# Patient Record
Sex: FEMALE | Race: WHITE | NOT HISPANIC OR LATINO | Employment: FULL TIME | URBAN - METROPOLITAN AREA
[De-identification: names, ages, dates, MRNs, and addresses within clinical notes are randomized per-mention and may not be internally consistent; named-entity substitution may affect disease eponyms.]

---

## 2018-03-17 LAB — HCV AB SER-ACNC: NEGATIVE

## 2020-03-16 ENCOUNTER — OFFICE VISIT (OUTPATIENT)
Dept: URGENT CARE | Facility: CLINIC | Age: 60
End: 2020-03-16
Payer: COMMERCIAL

## 2020-03-16 VITALS
DIASTOLIC BLOOD PRESSURE: 74 MMHG | HEIGHT: 63 IN | WEIGHT: 215.6 LBS | HEART RATE: 67 BPM | OXYGEN SATURATION: 100 % | RESPIRATION RATE: 18 BRPM | BODY MASS INDEX: 38.2 KG/M2 | SYSTOLIC BLOOD PRESSURE: 147 MMHG | TEMPERATURE: 98.2 F

## 2020-03-16 DIAGNOSIS — R35.0 URINE FREQUENCY: Primary | ICD-10-CM

## 2020-03-16 LAB
SL AMB  POCT GLUCOSE, UA: NEGATIVE
SL AMB LEUKOCYTE ESTERASE,UA: NEGATIVE
SL AMB POCT BILIRUBIN,UA: NEGATIVE
SL AMB POCT BLOOD,UA: NEGATIVE
SL AMB POCT CLARITY,UA: NORMAL
SL AMB POCT COLOR,UA: YELLOW
SL AMB POCT KETONES,UA: NORMAL
SL AMB POCT NITRITE,UA: NEGATIVE
SL AMB POCT PH,UA: 5
SL AMB POCT SPECIFIC GRAVITY,UA: 1.01
SL AMB POCT URINE PROTEIN: NEGATIVE
SL AMB POCT UROBILINOGEN: 0.2

## 2020-03-16 PROCEDURE — 87086 URINE CULTURE/COLONY COUNT: CPT | Performed by: NURSE PRACTITIONER

## 2020-03-16 PROCEDURE — 81002 URINALYSIS NONAUTO W/O SCOPE: CPT | Performed by: NURSE PRACTITIONER

## 2020-03-16 PROCEDURE — 99213 OFFICE O/P EST LOW 20 MIN: CPT | Performed by: NURSE PRACTITIONER

## 2020-03-16 RX ORDER — SIMVASTATIN 40 MG
40 TABLET ORAL
COMMUNITY
Start: 2019-09-11

## 2020-03-16 RX ORDER — CEPHALEXIN 500 MG/1
500 CAPSULE ORAL EVERY 12 HOURS SCHEDULED
Qty: 14 CAPSULE | Refills: 0 | Status: SHIPPED | OUTPATIENT
Start: 2020-03-16 | End: 2020-03-23

## 2020-03-16 RX ORDER — INSULIN GLARGINE 300 U/ML
INJECTION, SOLUTION SUBCUTANEOUS
COMMUNITY
Start: 2020-01-27

## 2020-03-16 RX ORDER — PIOGLITAZONE HCL AND METFORMIN HCL 850; 15 MG/1; MG/1
TABLET ORAL
COMMUNITY
Start: 2019-09-11

## 2020-03-16 RX ORDER — FLUCONAZOLE 150 MG/1
TABLET ORAL
COMMUNITY
Start: 2020-03-06 | End: 2020-06-12 | Stop reason: ALTCHOICE

## 2020-03-16 RX ORDER — METOPROLOL TARTRATE 100 MG/1
TABLET ORAL
COMMUNITY
Start: 2019-09-11

## 2020-03-16 RX ORDER — ALPRAZOLAM 0.25 MG/1
TABLET ORAL AS NEEDED
COMMUNITY
Start: 2019-11-11

## 2020-03-16 RX ORDER — CYCLOBENZAPRINE HCL 5 MG
TABLET ORAL AS NEEDED
COMMUNITY
Start: 2019-09-11 | End: 2020-07-24 | Stop reason: ALTCHOICE

## 2020-03-16 RX ORDER — LANCETS 23 GAUGE
EACH MISCELLANEOUS
COMMUNITY
Start: 2019-12-04

## 2020-03-16 RX ORDER — PEN NEEDLE, DIABETIC 32GX 5/32"
NEEDLE, DISPOSABLE MISCELLANEOUS DAILY
COMMUNITY
Start: 2020-03-07

## 2020-03-16 RX ORDER — TRIAMCINOLONE ACETONIDE 0.25 MG/G
CREAM TOPICAL
COMMUNITY
Start: 2020-03-06 | End: 2020-06-12 | Stop reason: ALTCHOICE

## 2020-03-16 NOTE — PATIENT INSTRUCTIONS
Urine dip does not appear to show UTI but will treat based on symptoms  Will send for a culture  Tylenol Motrin as needed for pain  Follow up with PCP or Urology if symptoms not improved  Go to the ER with any worsening symptoms, increased pressure, pain, fevers

## 2020-03-16 NOTE — PROGRESS NOTES
[facility]        NAME: Elyssa Desir is a 63 y o  female  : 1960    MRN: [id number]  DATE: 2020  TIME: 3:16 PM    Assessment and Plan   Urine frequency [R35 0]  1  Urine frequency  POCT urine dip    cephalexin (KEFLEX) 500 mg capsule         Patient Instructions     Patient Instructions   Urine dip does not appear to show UTI but will treat based on symptoms  Will send for a culture  Tylenol Motrin as needed for pain  Follow up with PCP or Urology if symptoms not improved  Go to the ER with any worsening symptoms, increased pressure, pain, fevers  Chief Complaint     Chief Complaint   Patient presents with    Urinary Frequency     started 2 weeks ago, was taking AZO burining, pressure and frequency         History of Present Illness   Mitzi Koenig presents to the clinic c/o    This is a 52-year-old female here today with complaints of urinary frequency  She states she has been having this for about 2 weeks  She is also having burning, frequency and urgency  She has been taking over-the-counter azo which is not helping  She denies any fevers bodies or chills  She does have suprapubic pressure  She is diabetic  She states her morning blood sugars are 150 and in the afternoon she has but 256  There is no glucose in her urine today  She denies any fevers body aches or chills  No discharge  Review of Systems   Review of Systems   Constitutional: Negative for activity change, chills, fatigue and fever  Respiratory: Negative  Cardiovascular: Negative  Genitourinary: Positive for dysuria, frequency and urgency  Negative for decreased urine volume, difficulty urinating, flank pain and hematuria  Psychiatric/Behavioral: Negative            Current Medications     Long-Term Medications   Medication Sig Dispense Refill    ALPRAZolam (XANAX) 0 25 mg tablet as needed      cyclobenzaprine (FLEXERIL) 5 mg tablet as needed      Lancets 43N MISC Delica Lancets to match New Glucose Monitor,  Test once daily  DX E11 9   Non Insulin Dependent   metoprolol tartrate (LOPRESSOR) 100 mg tablet TAKE 1/2 TAB BY MOUTH IN THE AM AND 1 FULL TAB IN THE PM      pioglitazone-metFORMIN (ACTOPLUS MET)  MG per tablet TAKE 1 TABLET BY MOUTH 2 TIMES DAILY WITH MEALS      simvastatin (ZOCOR) 40 mg tablet Take 40 mg by mouth      BD PEN NEEDLE LISSETT U/F 32G X 4 MM MISC daily As directed      TOUJEO SOLOSTAR 300 units/mL CONCETRATED U-300 injection pen (1-unit dial)       triamcinolone (KENALOG) 0 025 % cream APPLY TO AFFECTED AREA TWICE A DAY FOR 7 DAYS         Current Allergies     Allergies as of 03/16/2020 - Reviewed 03/16/2020   Allergen Reaction Noted    Codeine  03/30/2015    Diphenhydramine  03/30/2015    Lisinopril  03/30/2015    Sulfa antibiotics  03/30/2015            The following portions of the patient's history were reviewed and updated as appropriate: allergies, current medications, past family history, past medical history, past social history, past surgical history and problem list     Objective   /74   Pulse 67   Temp 98 2 °F (36 8 °C)   Resp 18   Ht 5' 3" (1 6 m)   Wt 97 8 kg (215 lb 9 6 oz)   SpO2 100%   BMI 38 19 kg/m²        Physical Exam     Physical Exam   Constitutional: She is oriented to person, place, and time  She appears well-developed and well-nourished  Cardiovascular: Normal rate and regular rhythm  Pulmonary/Chest: Effort normal and breath sounds normal    Abdominal:   Suprapubic pressure   Musculoskeletal: Normal range of motion  Neurological: She is alert and oriented to person, place, and time  Skin: Skin is warm and dry  Psychiatric: She has a normal mood and affect  Her behavior is normal  Judgment and thought content normal    Nursing note and vitals reviewed

## 2020-03-17 LAB — BACTERIA UR CULT: NORMAL

## 2020-03-19 ENCOUNTER — TELEPHONE (OUTPATIENT)
Dept: URGENT CARE | Facility: CLINIC | Age: 60
End: 2020-03-19

## 2020-03-19 NOTE — TELEPHONE ENCOUNTER
Aware of negative urine culture  She states she is feeling better with antibiotic  Complete antibiotic and follow-up with Urology  She verbalized understanding of these instructions

## 2020-06-12 ENCOUNTER — APPOINTMENT (OUTPATIENT)
Dept: RADIOLOGY | Facility: MEDICAL CENTER | Age: 60
End: 2020-06-12
Payer: COMMERCIAL

## 2020-06-12 ENCOUNTER — OFFICE VISIT (OUTPATIENT)
Dept: URGENT CARE | Facility: MEDICAL CENTER | Age: 60
End: 2020-06-12
Payer: COMMERCIAL

## 2020-06-12 VITALS
HEART RATE: 87 BPM | OXYGEN SATURATION: 100 % | RESPIRATION RATE: 20 BRPM | BODY MASS INDEX: 38.09 KG/M2 | WEIGHT: 215 LBS | HEIGHT: 63 IN | TEMPERATURE: 98 F | SYSTOLIC BLOOD PRESSURE: 172 MMHG | DIASTOLIC BLOOD PRESSURE: 87 MMHG

## 2020-06-12 DIAGNOSIS — S70.01XA CONTUSION OF RIGHT HIP, INITIAL ENCOUNTER: ICD-10-CM

## 2020-06-12 DIAGNOSIS — M79.671 RIGHT FOOT PAIN: ICD-10-CM

## 2020-06-12 DIAGNOSIS — S90.01XA CONTUSION OF RIGHT ANKLE, INITIAL ENCOUNTER: ICD-10-CM

## 2020-06-12 DIAGNOSIS — M25.571 ACUTE RIGHT ANKLE PAIN: ICD-10-CM

## 2020-06-12 DIAGNOSIS — S90.31XA CONTUSION OF RIGHT FOOT, INITIAL ENCOUNTER: Primary | ICD-10-CM

## 2020-06-12 DIAGNOSIS — M25.551 RIGHT HIP PAIN: ICD-10-CM

## 2020-06-12 PROCEDURE — 73502 X-RAY EXAM HIP UNI 2-3 VIEWS: CPT

## 2020-06-12 PROCEDURE — 73630 X-RAY EXAM OF FOOT: CPT

## 2020-06-12 PROCEDURE — 99213 OFFICE O/P EST LOW 20 MIN: CPT | Performed by: PHYSICIAN ASSISTANT

## 2020-06-12 PROCEDURE — 73610 X-RAY EXAM OF ANKLE: CPT

## 2020-07-24 ENCOUNTER — OFFICE VISIT (OUTPATIENT)
Dept: OBGYN CLINIC | Facility: MEDICAL CENTER | Age: 60
End: 2020-07-24
Payer: COMMERCIAL

## 2020-07-24 VITALS
TEMPERATURE: 97.2 F | BODY MASS INDEX: 38.27 KG/M2 | SYSTOLIC BLOOD PRESSURE: 138 MMHG | DIASTOLIC BLOOD PRESSURE: 70 MMHG | HEIGHT: 63 IN | WEIGHT: 216 LBS

## 2020-07-24 DIAGNOSIS — Z12.11 ENCOUNTER FOR SCREENING COLONOSCOPY: ICD-10-CM

## 2020-07-24 DIAGNOSIS — Z11.51 ENCOUNTER FOR SCREENING FOR HUMAN PAPILLOMAVIRUS (HPV): ICD-10-CM

## 2020-07-24 DIAGNOSIS — Z01.419 ENCOUNTER FOR ROUTINE GYNECOLOGICAL EXAMINATION WITH PAPANICOLAOU SMEAR OF CERVIX: Primary | ICD-10-CM

## 2020-07-24 DIAGNOSIS — Z12.31 ENCOUNTER FOR SCREENING MAMMOGRAM FOR MALIGNANT NEOPLASM OF BREAST: ICD-10-CM

## 2020-07-24 PROCEDURE — S0610 ANNUAL GYNECOLOGICAL EXAMINA: HCPCS | Performed by: NURSE PRACTITIONER

## 2020-07-24 NOTE — PROGRESS NOTES
ASSESSMENT & PLAN: Chapin Arita is a 63 y o   with normal gynecologic exam     1   Routine well woman exam done today  2  Pap and HPV:  The patient's last pap and hpv was about 5 years ago  It was normal     Pap with cotesting was done today  Current ASCCP Guidelines reviewed  3   Mammogram ordered  4  Colorectal cancer screening was ordered  5  The following were reviewed in today's visit: breast self exam, mammography screening ordered, menopause, adequate intake of calcium and vitamin D, exercise and healthy diet  6  rto one year    CC:  Annual Gynecologic Examination    HPI: Chapin Arita is a 63 y o  Duvall Vasquez who presents for annual gynecologic examination  She has the following concerns:  None  Over due for pap  Was taking care of mother who  but now focusing on herself  Seeing urologist who is evaluating her incontinence and possible kidney stone  Has follow up with them no interested in seeing uro gyn  Health Maintenance:    She wears her seatbelt routinely  She does perform regular monthly self breast exams  She feels safe at home  Pap: today  Last mammogram:2018  Last colonoscopy: given order never had  Past Medical History:   Diagnosis Date    Diabetes mellitus ([facility] [address] )     Hypertension        Past Surgical History:   Procedure Laterality Date    NASAL SEPTUM SURGERY         Past OB/Gyn History:  OB History        0    Para   0    Term   0       0    AB   0    Living   0       SAB   0    TAB   0    Ectopic   0    Multiple   0    Live Births   0               Pt does not have menstrual issues  History of sexually transmitted infection: No   History of abnormal pap smears: No      Patient is not currently sexually active  heterosexual   The current method of family planning is abstinence  History reviewed  No pertinent family history      Social History:  Social History     Socioeconomic History    Marital status: Single     Spouse name: Not on file  Number of children: Not on file    Years of education: Not on file    Highest education level: Not on file   Occupational History    Not on file   Social Needs    Financial resource strain: Not on file    Food insecurity:     Worry: Not on file     Inability: Not on file    Transportation needs:     Medical: Not on file     Non-medical: Not on file   Tobacco Use    Smoking status: Never Smoker    Smokeless tobacco: Never Used   Substance and Sexual Activity    Alcohol use: Never     Frequency: Never    Drug use: Never    Sexual activity: Not Currently   Lifestyle    Physical activity:     Days per week: Not on file     Minutes per session: Not on file    Stress: Not on file   Relationships    Social connections:     Talks on phone: Not on file     Gets together: Not on file     Attends Zoroastrian service: Not on file     Active member of club or organization: Not on file     Attends meetings of clubs or organizations: Not on file     Relationship status: Not on file    Intimate partner violence:     Fear of current or ex partner: Not on file     Emotionally abused: Not on file     Physically abused: Not on file     Forced sexual activity: Not on file   Other Topics Concern    Not on file   Social History Narrative    Not on file     Presently lives with self  Patient is currently unemployed   Allergies   Allergen Reactions    Codeine      Other reaction(s): "hyperactivity"    Diphenhydramine      Other reaction(s): "hyperactivity"    Lisinopril      Other reaction(s): Chest Tightness    Sulfa Antibiotics      Other reaction(s): Rash/swelling       Current Outpatient Medications:     ALPRAZolam (XANAX) 0 25 mg tablet, as needed , Disp: , Rfl:     ASPIRIN 81 PO, Take 81 mg by mouth daily, Disp: , Rfl:     BD PEN NEEDLE LISSETT U/F 32G X 4 MM MISC, daily As directed, Disp: , Rfl:     Lancets 04U MISC, Delica Lancets to match New Glucose Monitor,  Test once daily  DX E11 9    Non Insulin Dependent , Disp: , Rfl:     metoprolol tartrate (LOPRESSOR) 100 mg tablet, TAKE 1/2 TAB BY MOUTH IN THE AM AND 1 FULL TAB IN THE PM, Disp: , Rfl:     pioglitazone-metFORMIN (ACTOPLUS MET)  MG per tablet, TAKE 1 TABLET BY MOUTH 2 TIMES DAILY WITH MEALS, Disp: , Rfl:     simvastatin (ZOCOR) 40 mg tablet, Take 40 mg by mouth, Disp: , Rfl:     TOUJEO SOLOSTAR 300 units/mL CONCETRATED U-300 injection pen (1-unit dial), , Disp: , Rfl:       Review of Systems  Constitutional :no fever, feels well, no tiredness, no recent weight gain or loss  ENT: no ear ache, no loss of hearing, no nosebleeds or nasal discharge, no sore throat or hoarseness  Cardiovascular: no complaints of slow or fast heart beat, no chest pain, no palpitations, no leg claudication or lower extremity edema  Respiratory: no complaints of shortness of shortness of breath, no SCHNEIDER  Breasts:no complaints of breast pain, breast lump, or nipple discharge  Gastrointestinal: no complaints of abdominal pain, constipation, nausea, vomiting, or diarrhea or bloody stools  Genitourinary : no complaints of dysuria, incontinence, pelvic pain, no dysmenorrhea, vaginal discharge or abnormal vaginal bleeding and as noted in HPI  Musculoskeletal: no complaints of arthralgia, no myalgia, no joint swelling or stiffness, no limb pain or swelling    Integumentary: no complaints of skin rash or lesion, itching or dry skin  Neurological: no complaints of headache, no confusion, no numbness or tingling, no dizziness or fainting    Objective      /70   Temp (!) 97 2 °F (36 2 °C) (Temporal)   Ht 5' 3" (1 6 m)   Wt 98 kg (216 lb)   BMI 38 26 kg/m²     General:   appears stated age, cooperative, alert normal mood and affect   Neck: normal, supple,trachea midline, no masses   Heart: regular rate and rhythm, S1, S2 normal, no murmur, click, rub or gallop   Lungs: clear to auscultation bilaterally   Breasts: normal appearance, no masses or tenderness   Abdomen: soft, non-tender, without masses or organomegaly   Vulva: normal   Vagina: normal vagina   Urethra: normal   Cervix: Normal, no discharge  Uterus: normal size, contour, position, consistency, mobility, non-tender   Adnexa: no mass, fullness, tenderness   Lymphatic palpation of lymph nodes in neck, axilla, groin and/or other locations: no lymphadenopathy or masses noted   Skin normal skin turgor and no rashes     Psychiatric orientation to person, place, and time: normal  mood and affect: normal

## 2020-07-28 LAB
CLINICAL INFO: NORMAL
CYTO CVX: NORMAL
CYTOLOGY CMNT CVX/VAG CYTO-IMP: NORMAL
DATE PREVIOUS BX: NORMAL
HPV E6+E7 MRNA CVX QL NAA+PROBE: NOT DETECTED
LMP START DATE: NORMAL
SL AMB PREV. PAP:: NORMAL
SPECIMEN SOURCE CVX/VAG CYTO: NORMAL

## 2020-10-09 ENCOUNTER — OFFICE VISIT (OUTPATIENT)
Dept: GASTROENTEROLOGY | Facility: MEDICAL CENTER | Age: 60
End: 2020-10-09
Payer: COMMERCIAL

## 2020-10-09 ENCOUNTER — HOSPITAL ENCOUNTER (OUTPATIENT)
Dept: MAMMOGRAPHY | Facility: MEDICAL CENTER | Age: 60
Discharge: HOME | End: 2020-10-09
Payer: COMMERCIAL

## 2020-10-09 VITALS
HEART RATE: 78 BPM | HEIGHT: 63 IN | TEMPERATURE: 98.4 F | DIASTOLIC BLOOD PRESSURE: 64 MMHG | SYSTOLIC BLOOD PRESSURE: 122 MMHG | BODY MASS INDEX: 39.69 KG/M2 | WEIGHT: 224 LBS

## 2020-10-09 VITALS — HEIGHT: 63 IN | BODY MASS INDEX: 38.27 KG/M2 | WEIGHT: 216 LBS

## 2020-10-09 DIAGNOSIS — Z12.11 ENCOUNTER FOR SCREENING COLONOSCOPY: Primary | ICD-10-CM

## 2020-10-09 DIAGNOSIS — Z12.31 ENCOUNTER FOR SCREENING MAMMOGRAM FOR MALIGNANT NEOPLASM OF BREAST: ICD-10-CM

## 2020-10-09 PROCEDURE — 77063 BREAST TOMOSYNTHESIS BI: CPT

## 2020-10-09 PROCEDURE — 99203 OFFICE O/P NEW LOW 30 MIN: CPT | Performed by: INTERNAL MEDICINE

## 2020-10-09 PROCEDURE — 77067 SCR MAMMO BI INCL CAD: CPT

## 2020-11-09 ENCOUNTER — ANESTHESIA EVENT (OUTPATIENT)
Dept: GASTROENTEROLOGY | Facility: MEDICAL CENTER | Age: 60
End: 2020-11-09

## 2020-11-09 ENCOUNTER — HOSPITAL ENCOUNTER (OUTPATIENT)
Dept: GASTROENTEROLOGY | Facility: MEDICAL CENTER | Age: 60
Setting detail: OUTPATIENT SURGERY
Discharge: HOME | End: 2020-11-09
Attending: INTERNAL MEDICINE
Payer: COMMERCIAL

## 2020-11-09 ENCOUNTER — ANESTHESIA (OUTPATIENT)
Dept: GASTROENTEROLOGY | Facility: MEDICAL CENTER | Age: 60
End: 2020-11-09

## 2020-11-09 VITALS
HEART RATE: 72 BPM | RESPIRATION RATE: 18 BRPM | DIASTOLIC BLOOD PRESSURE: 72 MMHG | SYSTOLIC BLOOD PRESSURE: 155 MMHG | WEIGHT: 220 LBS | BODY MASS INDEX: 38.98 KG/M2 | TEMPERATURE: 97.6 F | OXYGEN SATURATION: 100 % | HEIGHT: 63 IN

## 2020-11-09 VITALS — HEART RATE: 80 BPM

## 2020-11-09 DIAGNOSIS — Z12.11 ENCOUNTER FOR SCREENING COLONOSCOPY: ICD-10-CM

## 2020-11-09 LAB — GLUCOSE SERPL-MCNC: 136 MG/DL (ref 65–140)

## 2020-11-09 PROCEDURE — G0121 COLON CA SCRN NOT HI RSK IND: HCPCS | Performed by: INTERNAL MEDICINE

## 2020-11-09 PROCEDURE — 82948 REAGENT STRIP/BLOOD GLUCOSE: CPT

## 2020-11-09 RX ORDER — PROPOFOL 10 MG/ML
INJECTION, EMULSION INTRAVENOUS CONTINUOUS PRN
Status: DISCONTINUED | OUTPATIENT
Start: 2020-11-09 | End: 2020-11-09

## 2020-11-09 RX ORDER — SODIUM CHLORIDE 9 MG/ML
125 INJECTION, SOLUTION INTRAVENOUS CONTINUOUS
Status: DISCONTINUED | OUTPATIENT
Start: 2020-11-09 | End: 2020-11-13 | Stop reason: HOSPADM

## 2020-11-09 RX ORDER — PROPOFOL 10 MG/ML
INJECTION, EMULSION INTRAVENOUS AS NEEDED
Status: DISCONTINUED | OUTPATIENT
Start: 2020-11-09 | End: 2020-11-09

## 2020-11-09 RX ADMIN — LIDOCAINE HYDROCHLORIDE 40 MG: 20 INJECTION, SOLUTION INTRAVENOUS at 09:14

## 2020-11-09 RX ADMIN — PROPOFOL 50 MG: 10 INJECTION, EMULSION INTRAVENOUS at 09:14

## 2020-11-09 RX ADMIN — SODIUM CHLORIDE 125 ML/HR: 0.9 INJECTION, SOLUTION INTRAVENOUS at 08:32

## 2020-11-09 RX ADMIN — PROPOFOL 100 MCG/KG/MIN: 10 INJECTION, EMULSION INTRAVENOUS at 09:16

## 2021-01-30 LAB — HBA1C MFR BLD HPLC: 7.6 %

## 2021-03-25 ENCOUNTER — TELEPHONE (OUTPATIENT)
Dept: ADMINISTRATIVE | Facility: OTHER | Age: 61
End: 2021-03-25

## 2021-03-25 NOTE — TELEPHONE ENCOUNTER
----- Message from Sunshine Wade, Emily Yane Lavonne Lee sent at 3/25/2021 10:39 AM EDT -----  Regarding: A1c/ Hep c/ [facility]  03/25/21 10:40 AM    Hello, our patient Lori Bowman has had Hemoglobin A1c and Hepatitis C completed/performed  Please assist in updating the patient chart by pulling the Care Everywhere (CE) document  The date of service is 1/30/21 and 3/17/2018       Thank you,  Sunshine Wade MA  [facility]

## 2021-03-25 NOTE — TELEPHONE ENCOUNTER
Upon review of the In Basket request we were able to locate, review, and update the patient chart as requested for Hemoglobin A1c and Hepatitis C   Any additional questions or concerns should be emailed to the Practice Liaisons via [email or web address] email, please do not reply via In Basket      Thank you  William Chavez MA

## 2022-12-28 ENCOUNTER — OFFICE VISIT (OUTPATIENT)
Dept: URGENT CARE | Facility: MEDICAL CENTER | Age: 62
End: 2022-12-28

## 2022-12-28 VITALS
DIASTOLIC BLOOD PRESSURE: 80 MMHG | SYSTOLIC BLOOD PRESSURE: 142 MMHG | OXYGEN SATURATION: 98 % | RESPIRATION RATE: 20 BRPM | TEMPERATURE: 98.3 F | BODY MASS INDEX: 34.73 KG/M2 | HEART RATE: 85 BPM | WEIGHT: 196 LBS | HEIGHT: 63 IN

## 2022-12-28 DIAGNOSIS — R05.1 ACUTE COUGH: ICD-10-CM

## 2022-12-28 DIAGNOSIS — J02.9 SORE THROAT: Primary | ICD-10-CM

## 2022-12-28 LAB — S PYO AG THROAT QL: NEGATIVE

## 2022-12-28 RX ORDER — LOSARTAN POTASSIUM 100 MG/1
100 TABLET ORAL DAILY
COMMUNITY
Start: 2022-10-20

## 2022-12-28 RX ORDER — SIMVASTATIN 20 MG
TABLET ORAL
COMMUNITY
Start: 2022-10-22

## 2022-12-28 RX ORDER — SEMAGLUTIDE 1.34 MG/ML
INJECTION, SOLUTION SUBCUTANEOUS
COMMUNITY
Start: 2022-12-24

## 2022-12-28 RX ORDER — AZELASTINE 1 MG/ML
1 SPRAY, METERED NASAL 2 TIMES DAILY
Qty: 1 ML | Refills: 0 | Status: SHIPPED | OUTPATIENT
Start: 2022-12-28

## 2022-12-28 RX ORDER — LIDOCAINE HYDROCHLORIDE 20 MG/ML
15 SOLUTION OROPHARYNGEAL 4 TIMES DAILY PRN
Qty: 100 ML | Refills: 0 | Status: SHIPPED | OUTPATIENT
Start: 2022-12-28

## 2022-12-28 RX ORDER — AMLODIPINE BESYLATE 2.5 MG/1
2.5 TABLET ORAL DAILY
COMMUNITY
Start: 2022-11-09

## 2022-12-28 NOTE — PROGRESS NOTES
[facility]        NAME: Onelia Carrillo is a 65 y o  female  : 1960    MRN: [id number]  DATE: 2022  TIME: 1:59 PM    Assessment and Plan   Sore throat [J02 9]  1  Sore throat  POCT rapid strepA    Throat culture    Lidocaine Viscous HCl (XYLOCAINE) 2 % mucosal solution    azelastine (ASTELIN) 0 1 % nasal spray      2  Acute cough  Covid/Flu-Office Collect    azelastine (ASTELIN) 0 1 % nasal spray            Patient Instructions     Use medications as directed for symptomatic relief as needed  Motrin and/or Tylenol as needed for fever and pain  Follow up with PCP in 3-5 days  Proceed to  ER if symptoms worsen  Chief Complaint     Chief Complaint   Patient presents with   • Sore Throat     Patient started with sore throat, cough, and slight fever on Monday  She had a negative covid test times two but was exposed to covid on          History of Present Illness       58-year-old female presents with sore throat cough fevers  Symptoms started on Monday  Reports that 2 family members had COVID while she was there  Took a home COVID test which was negative  Denies any chest pain shortness of breath  Having a lot of sore throats  Sore Throat   This is a new problem  The current episode started in the past 7 days  The problem has been waxing and waning  The maximum temperature recorded prior to her arrival was 100 4 - 100 9 F  The pain is moderate  Associated symptoms include congestion and coughing  Pertinent negatives include no abdominal pain, diarrhea, drooling, ear pain, headaches, hoarse voice, shortness of breath, swollen glands or trouble swallowing  Exposure to: COVID  She has tried cool liquids, gargles and acetaminophen for the symptoms  The treatment provided mild relief  Review of Systems   Review of Systems   Constitutional: Positive for fatigue and fever  HENT: Positive for congestion, postnasal drip, rhinorrhea and sore throat   Negative for drooling, ear pain, hoarse voice, sinus pressure and trouble swallowing  Eyes: Negative  Respiratory: Positive for cough  Negative for shortness of breath  Cardiovascular: Negative  Gastrointestinal: Negative  Negative for abdominal pain and diarrhea  Musculoskeletal: Negative  Skin: Negative  Neurological: Negative  Negative for headaches  Current Medications       Current Outpatient Medications:   •  azelastine (ASTELIN) 0 1 % nasal spray, 1 spray into each nostril 2 (two) times a day Use in each nostril as directed, Disp: 1 mL, Rfl: 0  •  Lidocaine Viscous HCl (XYLOCAINE) 2 % mucosal solution, Swish and spit 15 mL 4 (four) times a day as needed for mouth pain or discomfort, Disp: 100 mL, Rfl: 0  •  ALPRAZolam (XANAX) 0 25 mg tablet, as needed , Disp: , Rfl:   •  amLODIPine (NORVASC) 2 5 mg tablet, Take 2 5 mg by mouth daily, Disp: , Rfl:   •  ASPIRIN 81 PO, Take 81 mg by mouth daily, Disp: , Rfl:   •  BD PEN NEEDLE LISSETT U/F 32G X 4 MM MISC, daily As directed, Disp: , Rfl:   •  Lancets 72S MISC, Delica Lancets to match New Glucose Monitor,  Test once daily  DX E11 9   Non Insulin Dependent , Disp: , Rfl:   •  losartan (COZAAR) 100 MG tablet, Take 100 mg by mouth daily, Disp: , Rfl:   •  metFORMIN (GLUCOPHAGE) 500 mg tablet, Take 1,000 mg by mouth 2 (two) times a day with meals, Disp: , Rfl:   •  metoprolol tartrate (LOPRESSOR) 100 mg tablet, TAKE 1/2 TAB BY MOUTH IN THE AM AND 1 FULL TAB IN THE PM, Disp: , Rfl:   •  Ozempic, 0 25 or 0 5 MG/DOSE, 2 MG/1 5ML SOPN, TAKE 0 25MG WEEKLY FOR 3 WEEKS  THEN, INCREASE TO 0 5MG WEEKLY , Disp: , Rfl:   •  pioglitazone-metFORMIN (ACTOPLUS MET)  MG per tablet, TAKE 1 TABLET BY MOUTH 2 TIMES DAILY WITH MEALS, Disp: , Rfl:   •  simvastatin (ZOCOR) 20 mg tablet, TAKE 1 TABLET (20 MG TOTAL) BY MOUTH EVERY EVENING   DOSE HAS BEEN DECREASED, Disp: , Rfl:   •  simvastatin (ZOCOR) 40 mg tablet, Take 40 mg by mouth, Disp: , Rfl:   •  TOUJEO SOLOSTAR 300 units/mL CONCETRATED U-300 injection pen (1-unit dial), , Disp: , Rfl:     Current Allergies     Allergies as of 12/28/2022 - Reviewed 12/28/2022   Allergen Reaction Noted   • Codeine  03/30/2015   • Diphenhydramine  03/30/2015   • Lisinopril  03/30/2015   • Sulfa antibiotics  03/30/2015            The following portions of the patient's history were reviewed and updated as appropriate: allergies, current medications, past family history, past medical history, past social history, past surgical history and problem list      Past Medical History:   Diagnosis Date   • Diabetes mellitus ([facility] [address] )    • Hypertension        Past Surgical History:   Procedure Laterality Date   • NASAL SEPTUM SURGERY         Family History   Problem Relation Age of Onset   • Prostate cancer Father    • Lung cancer Sister          Medications have been verified  Objective   /80   Pulse 85   Temp 98 3 °F (36 8 °C)   Resp 20   Ht 5' 3" (1 6 m)   Wt 88 9 kg (196 lb)   SpO2 98%   BMI 34 72 kg/m²   No LMP recorded  Patient is postmenopausal        Physical Exam     Physical Exam  Vitals and nursing note reviewed  Constitutional:       General: She is not in acute distress  Appearance: Normal appearance  She is well-developed  HENT:      Head: Normocephalic and atraumatic  Right Ear: Hearing, tympanic membrane, ear canal and external ear normal  There is no impacted cerumen  Left Ear: Hearing, tympanic membrane, ear canal and external ear normal  There is no impacted cerumen  Nose: Congestion and rhinorrhea present  Mouth/Throat:      Pharynx: Uvula midline  Posterior oropharyngeal erythema (Mild to moderate) present  No oropharyngeal exudate  Eyes:      General:         Right eye: No discharge  Left eye: No discharge  Conjunctiva/sclera: Conjunctivae normal    Cardiovascular:      Rate and Rhythm: Normal rate and regular rhythm  Heart sounds: Normal heart sounds  No murmur heard    Pulmonary: Effort: Pulmonary effort is normal  No respiratory distress  Breath sounds: Normal breath sounds  No wheezing or rales  Abdominal:      General: Bowel sounds are normal       Palpations: Abdomen is soft  Tenderness: There is no abdominal tenderness  Musculoskeletal:         General: Normal range of motion  Cervical back: Normal range of motion and neck supple  Lymphadenopathy:      Cervical: Cervical adenopathy present  Skin:     General: Skin is warm and dry  Neurological:      Mental Status: She is alert and oriented to person, place, and time     Psychiatric:         Mood and Affect: Mood normal

## 2022-12-28 NOTE — PATIENT INSTRUCTIONS
Use medications as directed for symptomatic relief as needed  Motrin and/or Tylenol as needed for fever and pain  Follow up with PCP in 3-5 days  Proceed to  ER if symptoms worsen  COVID-19 (Coronavirus Disease 2019)   AMBULATORY CARE:   What you need to know about COVID-19:  COVID-19 is the disease caused by a coronavirus first discovered in December 2019  Coronaviruses generally cause upper respiratory (nose, throat, and lung) infections, such as a cold  The 2019 virus spreads quickly and easily  It can be spread starting 2 to 3 days before symptoms even begin  What you need to know about variants: The virus has changed into several new forms (called variants) since it was discovered  The variants may be more contagious (easily spread) than the original form  Some may also cause more severe illness than others  Signs and symptoms of COVID-19  may not develop  Signs and symptoms usually start about 5 days after infection but can take 2 to 14 days  You may feel like you have the flu or a bad cold  Some signs and symptoms go away in a few days  Others can last weeks, months, or possibly years  You may have any of the following:  A cough    Shortness of breath or trouble breathing that may become severe    A fever    Chills that might include shaking    Muscle pain, body aches, or a headache    A sore throat    Sudden changes or loss of your taste or smell    Feeling mentally and physically tired (fatigue)    Congestion (stuffy head and nose), or a runny nose    Diarrhea, nausea, or vomiting    Call your local emergency number (911 in the [address] [place] [address],[address]) if:   You have trouble breathing or shortness of breath at rest     You have chest pain or pressure that lasts longer than 5 minutes  You become confused or hard to wake  Your lips or face are blue  Seek care immediately if:   You have a fever of 104°F (40°C) or higher  Call your doctor if:   You have symptoms of COVID-19      You have questions or concerns about your condition or care  How COVID-19 is diagnosed:  Testing is offered at many sites  You may need to quarantine until you get your results  Any of the following tests may be used:  A viral PCR test  shows if you have a current infection  A sample is taken from your nose or throat with a swab  You may need to wait 1 or more days to get the test results  An antigen test  shows if you have a protein from the COVID-19 virus  This test is often called a rapid test because the results can be available in 30 minutes or less  An antibody test  shows if you had a recent or past infection  Blood samples are used for this test  Antibodies are made by your immune system to fight the virus that causes COVID-19  Antibodies form 1 to 3 weeks after you are infected  This test is not used to show if you are immune to the virus  A CT, MRI, ultrasound, or x-ray  may be used to check for complications of [id number]  These may include pneumonia, blood clots, or other complications  Treatment:   Mild symptoms  may get better on their own  Some treatments have emergency use authorization (EUA)  Examples include monoclonal antibodies and convalescent plasma  These may be given to help prevent worsening of your symptoms  You may also need any of the following:    Decongestants  help reduce nasal congestion and help you breathe more easily  If you take decongestant pills, they may make you feel restless or cause problems with your sleep  Do not use decongestant sprays for more than a few days  Cough suppressants  help reduce coughing  Ask your healthcare provider which type of cough medicine is best for you  To soothe a sore throat,  gargle with warm salt water, or use throat lozenges or a throat spray  Drink more liquids to thin and loosen mucus and to prevent dehydration  NSAIDs or acetaminophen  can help lower a fever and relieve body aches or a headache  Follow directions   If not taken correctly, NSAIDs can cause kidney damage and acetaminophen can cause liver damage  Severe or life-threatening symptoms  are treated in the hospital  You may need any of the following:     Medicines  may be given to fight the virus or treat inflammation  Blood thinners  help prevent or treat blood clots  If you have a deep vein thrombosis (DVT) or pulmonary embolism (PE), you may need to use blood thinners for at least 3 months  Extra oxygen  may be given if you have respiratory failure  This means your lungs cannot get enough oxygen into your blood and out to your organs  A ventilator  may be used to help you breathe  What you need to know about health problems the virus may cause: You may develop long-term health problems caused by the virus  Your risk is higher if you are 65 or older  A weak immune system, obesity, diabetes, chronic kidney disease, or a heart or lung condition can also increase your risk  Your risk is also higher if you are a current or former cigarette smoker  COVID-19 can lead to any of the following:  Multisymptom inflammatory syndrome in adults (MIS-A) or in children (MIS-C), causing inflammation in the heart, digestive system, skin, or brain    Shortness of breath, serious lower respiratory conditions, such as pneumonia or acute respiratory distress syndrome (ARDS)    Blood clots or blood vessel damage    Organ damage from a lack of oxygen or from blood clots    Sleep problems    Problems thinking clearly, remembering information, or concentrating    Mood changes, depression, or anxiety    Long-term problems tasting or smelling    Loss of appetite and weight loss    Nerve pain    Fatigue (feeling mentally and physically tired)    What you need to know about COVID-19 vaccines:  Healthcare providers recommend a COVID-19 vaccine, even if you have already had COVID-19  You are considered fully vaccinated against COVID-19 two weeks after the final dose of any COVID-19 vaccine   Let your healthcare provider know when you have received the final dose of the vaccine  Make a copy of your vaccination card  Keep the original with you in case you need to show it  Keep the copy in a safe place  COVID-19 vaccines are given as a shot in 1 or 2 doses  Vaccination is recommended for everyone 5 years or older  One 2-dose vaccine is fully approved  for those 16 years or older  This vaccine also has an emergency use authorization (EUA) for children 11to 13years old  No vaccine is currently available for children younger than 5 years  A booster (additional) dose  is given to help the immune system continue to protect against severe COVID-19  A booster is recommended for all adults 18 or older  The booster can be a different brand of the COVID-19 vaccine than you originally received  The timing for the booster depends on the type of vaccine you received:    1-dose vaccine: The booster is given at least 2 months after you received the vaccine  2-dose vaccine: The booster is given at least 5 or 6 months after the second dose  A booster can be given to adolescents 15to 16years old  Only 1 COVID-19 vaccine has this EUA  The booster is given at least 5 months after the second dose of the original vaccine series  A booster is recommended for immunocompromised children 11to 6years old  Only 1 COVID-19 vaccine has this EUA  The booster is given 28 days after the second dose  Continue social distancing and other measures, even after you get the vaccine  Although it is not common, you can become infected after you get the vaccine  You may also be able to pass the virus to others without knowing you are infected  After you get the vaccine, check local, national, and international travel rules  You may need to be tested before you travel  Some countries require proof of a negative test before you travel  You may also need to quarantine after you return      Medicine may be given to prevent infection  The medicine can be given if you are at high risk for infection and cannot get the vaccine  It can also be given if your immune system does not respond well to the vaccine  How the 2019 coronavirus spreads:   Droplets are the main way all coronaviruses spread  The virus travels in droplets that form when a person talks, sings, coughs, or sneezes  The droplets can also float in the air for minutes or hours  Infection happens when you breathe in the droplets or get them in your eyes or nose  Close personal contact with an infected person increases your risk for infection  This means being within 6 feet (2 meters) of the person for at least 15 minutes over 24 hours  Person-to-person contact can spread the virus  For example, a person with the virus on his or her hands can spread it by shaking hands with someone  The virus can stay on objects and surfaces for up to 3 days  You may become infected by touching the object or surface and then touching your eyes or mouth  Help lower the risk for COVID-19:   Wash your hands often throughout the day  Use soap and water  Rub your soapy hands together, lacing your fingers, for at least 20 seconds  Rinse with warm, running water  Dry your hands with a clean towel or paper towel  Use hand  that contains alcohol if soap and water are not available  Teach children how to wash their hands and use hand   Cover sneezes and coughs  Turn your face away and cover your mouth and nose with a tissue  Throw the tissue away  Use the bend of your arm if a tissue is not available  Then wash your hands well with soap and water or use hand   Teach children how to cover a cough or sneeze  Wear a face covering (mask) when needed  Use a cloth covering with at least 2 layers  You can also create layers by putting a cloth covering over a disposable non-medical mask  Cover your mouth and your nose           Follow worldwide, national, and local social distancing guidelines  Keep at least 6 feet (2 meters) between you and others  Try not to touch your face  If you get the virus on your hands, you can transfer it to your eyes, nose, or mouth and become infected  You can also transfer it to objects, surfaces, or people  Clean and disinfect high-touch surfaces and objects often  Use disinfecting wipes, or make a solution of 4 teaspoons of bleach in 1 quart (4 cups) of water  Ask about other vaccines you may need  Get the influenza (flu) vaccine as soon as recommended each year, usually starting in September or October  Get the pneumonia vaccine if recommended  Your healthcare provider can tell you if you should also get other vaccines, and when to get them  Follow social distancing guidelines:  National and local social distancing rules vary  Rules and restrictions may change over time as restrictions are lifted  The following are general guidelines:  Stay home if you are sick or think you may have COVID-19  It is important to stay home if you are waiting for a testing appointment or for test results  Avoid close physical contact with anyone who does not live in your home  Do not shake hands with, hug, or kiss a person as a greeting  If you must use public transportation (such as a bus or subway), try to sit or stand away from others  Wear your face covering  Avoid in-person gatherings and crowds  Attend virtually if possible  Follow up with your doctor as directed:  Write down your questions so you remember to ask them during your visits  For more information:   [facility]  [address] , [address]  Phone: 7- 799 - [phone]  Web Address: [employer] com br    © Copyright [employer] 2022 Information is for End User's use only and may not be sold, redistributed or otherwise used for commercial purposes   All illustrations and images included in CareNotes® are the copyrighted property of A D A M , Inc  or [address]  The above information is an  only  It is not intended as medical advice for individual conditions or treatments  Talk to your doctor, nurse or pharmacist before following any medical regimen to see if it is safe and effective for you  Pharyngitis   AMBULATORY CARE:   Pharyngitis , or sore throat, is inflammation of the tissues and structures in your pharynx (throat)  Pharyngitis is most often caused by bacteria  It may also be caused by a cold or flu virus  Other causes include smoking, allergies, or acid reflux  Signs and symptoms that may occur with pharyngitis:   Sore throat or pain when you swallow    Fever, chills, and body aches    Hoarse or raspy voice    Cough, runny or stuffy nose, itchy or watery eyes    Headache    Upset stomach and loss of appetite    Mild neck stiffness    Swollen glands that feel like hard lumps when you touch your neck    White and yellow pus-filled blisters in the back of your throat    Call 911 for any of the following: You have trouble breathing or swallowing because your throat is swollen or sore  Seek care immediately if:   You are drooling because it hurts too much to swallow  Your fever is higher than 102? F (39?C) or lasts longer than 3 days  You are confused  You taste blood in your throat  Contact your healthcare provider if:   Your throat pain gets worse  You have a painful lump in your throat that does not go away after 5 days  Your symptoms do not improve after 5 days  You have questions or concerns about your condition or care  Treatment for pharyngitis:  Viral pharyngitis will go away on its own without treatment  Your sore throat should start to feel better in 3 to 5 days for both viral and bacterial infections  You may need any of the following:  Antibiotics  treat a bacterial infection      NSAIDs , such as ibuprofen, help decrease swelling, pain, and fever  NSAIDs can cause stomach bleeding or kidney problems in certain people  If you take blood thinner medicine, always ask your healthcare provider if NSAIDs are safe for you  Always read the medicine label and follow directions  Acetaminophen  decreases pain and fever  It is available without a doctor's order  Ask how much to take and how often to take it  Follow directions  Acetaminophen can cause liver damage if not taken correctly  Manage your symptoms:   Gargle salt water  Mix ¼ teaspoon salt in an 8 ounce glass of warm water and gargle  This may help decrease swelling in your throat  Drink liquids as directed  You may need to drink more liquids than usual  Liquids may help soothe your throat and prevent dehydration  Ask how much liquid to drink each day and which liquids are best for you  Use a cool-steam humidifier  to help moisten the air in your room and calm your cough  Soothe your throat  with cough drops, ice, soft foods, or popsicles  Prevent the spread of pharyngitis:  Cover your mouth and nose when you cough or sneeze  Do not share food or drinks  Wash your hands often  Use soap and water  If soap and water are unavailable, use an alcohol based hand   Follow up with your doctor as directed:  Write down your questions so you remember to ask them during your visits  © Copyright [employer] 2022 Information is for End User's use only and may not be sold, redistributed or otherwise used for commercial purposes  All illustrations and images included in CareNotes® are the copyrighted property of A D A M , Inc  or Chava Parada   The above information is an  only  It is not intended as medical advice for individual conditions or treatments  Talk to your doctor, nurse or pharmacist before following any medical regimen to see if it is safe and effective for you

## 2022-12-29 LAB
FLUAV RNA RESP QL NAA+PROBE: NEGATIVE
FLUBV RNA RESP QL NAA+PROBE: NEGATIVE
SARS-COV-2 RNA RESP QL NAA+PROBE: POSITIVE

## 2022-12-31 ENCOUNTER — TELEPHONE (OUTPATIENT)
Dept: URGENT CARE | Facility: MEDICAL CENTER | Age: 62
End: 2022-12-31

## 2022-12-31 DIAGNOSIS — J02.0 STREP THROAT: Primary | ICD-10-CM

## 2022-12-31 LAB — BACTERIA THROAT CULT: ABNORMAL

## 2022-12-31 RX ORDER — CEPHALEXIN 500 MG/1
500 CAPSULE ORAL EVERY 12 HOURS SCHEDULED
Qty: 20 CAPSULE | Refills: 0 | Status: SHIPPED | OUTPATIENT
Start: 2022-12-31 | End: 2023-01-10

## 2022-12-31 NOTE — TELEPHONE ENCOUNTER
Received call from patient today  She found that her throat culture was positive for strep infection  Patient was seen here at the urgent care December 28 and tested at that time  Review lab results which revealed group C beta strep  Sent prescription for cephalexin 500 mg twice a day for 10 days  Patient aware

## 2024-11-07 ENCOUNTER — APPOINTMENT (OUTPATIENT)
Dept: RADIOLOGY | Facility: MEDICAL CENTER | Age: 64
End: 2024-11-07
Payer: COMMERCIAL

## 2024-11-07 ENCOUNTER — OFFICE VISIT (OUTPATIENT)
Dept: URGENT CARE | Facility: MEDICAL CENTER | Age: 64
End: 2024-11-07
Payer: COMMERCIAL

## 2024-11-07 VITALS
RESPIRATION RATE: 18 BRPM | SYSTOLIC BLOOD PRESSURE: 165 MMHG | BODY MASS INDEX: 36.36 KG/M2 | DIASTOLIC BLOOD PRESSURE: 90 MMHG | HEIGHT: 63 IN | TEMPERATURE: 98 F | HEART RATE: 75 BPM | OXYGEN SATURATION: 100 % | WEIGHT: 205.2 LBS

## 2024-11-07 DIAGNOSIS — S60.551A FOREIGN BODY OF RIGHT HAND, INITIAL ENCOUNTER: ICD-10-CM

## 2024-11-07 DIAGNOSIS — M79.642 HAND PAIN, LEFT: ICD-10-CM

## 2024-11-07 DIAGNOSIS — M79.642 HAND PAIN, LEFT: Primary | ICD-10-CM

## 2024-11-07 PROCEDURE — 73130 X-RAY EXAM OF HAND: CPT

## 2024-11-07 PROCEDURE — G0383 LEV 4 HOSP TYPE B ED VISIT: HCPCS

## 2024-11-07 PROCEDURE — S9083 URGENT CARE CENTER GLOBAL: HCPCS

## 2024-11-07 NOTE — PATIENT INSTRUCTIONS
Follow-up with the hand surgeon for removal of the foreign body  Do not get your MRI until follow-up

## 2024-11-07 NOTE — PROGRESS NOTES
[facility]        NAME: Lien Patel is a 64 y.o. female  : 1960    MRN: [id number]  DATE: 2024  TIME: 2:57 PM    Assessment and Plan   Hand pain, left [M79.642]  1. Hand pain, left  XR hand 3+ vw left        Follow-up with a hand specialist for foreign body in your right hand.  You may need to reschedule your MRI.    Patient Instructions   Follow-up with the hand surgeon for removal of the foreign body  Do not get your MRI until follow-up    Follow up with PCP in 3-5 days.  Proceed to  ER if symptoms worsen.    If tests have been performed at [facility], our office will contact you with results if changes need to be made to the care plan discussed with you at the visit.  [facility].    Chief Complaint     Chief Complaint   Patient presents with    selling in hand     Redness in right hand, swelling in right hand, rash/breacking out on right arm, swelling in pinky, and  soreness on hand when pressed         History of Present Illness       This is a 64-year-old female who presents today with a questionable foreign body in her right hand.  She states it has been there 6 to 8 weeks.  She is concerned because she is getting an MRI.  She states she feels something firm on the lateral aspect of her hand no redness or swelling noted.  He has been small area of firmness the size of a pinhead noted on the hand.  She denies any pain unless she is touching it.  She also has an area on the distal end of her left fifth finger which she is concerned about.  Patient denies any fever or patient states that she grabbed a metal rack and also a taylor basket and is not sure if she has a foreign object in her hand chills        Review of Systems   Review of Systems   Constitutional: Negative.    HENT: Negative.     Respiratory: Negative.     Cardiovascular: Negative.    Gastrointestinal: Negative.    Musculoskeletal:  Positive for myalgias.   Skin:  Positive for  wound.   Neurological: Negative.          Current Medications       Current Outpatient Medications:     amLODIPine (NORVASC) 2.5 mg tablet, Take 2.5 mg by mouth daily, Disp: , Rfl:     ASPIRIN 81 PO, Take 81 mg by mouth daily, Disp: , Rfl:     Lancets 28G MISC, Delica Lancets to match New Glucose Monitor,  Test once daily. DX E11.9 . Non Insulin Dependent., Disp: , Rfl:     losartan (COZAAR) 100 MG tablet, Take 100 mg by mouth daily, Disp: , Rfl:     metFORMIN (GLUCOPHAGE) 500 mg tablet, Take 1,000 mg by mouth 2 (two) times a day with meals, Disp: , Rfl:     metoprolol tartrate (LOPRESSOR) 100 mg tablet, TAKE 1/2 TAB BY MOUTH IN THE AM AND 1 FULL TAB IN THE PM, Disp: , Rfl:     pioglitazone-metFORMIN (ACTOPLUS MET)  MG per tablet, TAKE 1 TABLET BY MOUTH 2 TIMES DAILY WITH MEALS, Disp: , Rfl:     simvastatin (ZOCOR) 40 mg tablet, Take 40 mg by mouth, Disp: , Rfl:     ALPRAZolam (XANAX) 0.25 mg tablet, as needed  (Patient not taking: Reported on 11/7/2024), Disp: , Rfl:     azelastine (ASTELIN) 0.1 % nasal spray, 1 spray into each nostril 2 (two) times a day Use in each nostril as directed (Patient not taking: Reported on 11/7/2024), Disp: 1 mL, Rfl: 0    BD PEN NEEDLE LISSETT U/F 32G X 4 MM MISC, daily As directed, Disp: , Rfl:     Lidocaine Viscous HCl (XYLOCAINE) 2 % mucosal solution, Swish and spit 15 mL 4 (four) times a day as needed for mouth pain or discomfort (Patient not taking: Reported on 11/7/2024), Disp: 100 mL, Rfl: 0    Ozempic, 0.25 or 0.5 MG/DOSE, 2 MG/1.5ML SOPN, TAKE 0.25MG WEEKLY FOR 3 WEEKS. THEN, INCREASE TO 0.5MG WEEKLY. (Patient not taking: Reported on 11/7/2024), Disp: , Rfl:     simvastatin (ZOCOR) 20 mg tablet, TAKE 1 TABLET (20 MG TOTAL) BY MOUTH EVERY EVENING. DOSE HAS BEEN DECREASED, Disp: , Rfl:     TOUJEO SOLOSTAR 300 units/mL CONCETRATED U-300 injection pen (1-unit dial), , Disp: , Rfl:     Current Allergies     Allergies as of 11/07/2024 - Reviewed 12/28/2022   Allergen Reaction  "Noted    Codeine  03/30/2015    Diphenhydramine  03/30/2015    Lisinopril  03/30/2015    Sulfa antibiotics  03/30/2015            The following portions of the patient's history were reviewed and updated as appropriate: allergies, current medications, past family history, past medical history, past social history, past surgical history and problem list.     Past Medical History:   Diagnosis Date    Diabetes mellitus (HCC)     Hypertension        Past Surgical History:   Procedure Laterality Date    NASAL SEPTUM SURGERY         Family History   Problem Relation Age of Onset    Prostate cancer Father     Lung cancer Sister          Medications have been verified.        Objective   BP (!) 175/74 (BP Location: Left arm)   Pulse 75   Temp 98 °F (36.7 °C) (Tympanic)   Resp 18   Ht 5' 3\" (1.6 m)   Wt 93.1 kg (205 lb 3.2 oz)   SpO2 100%   BMI 36.35 kg/m²   No LMP recorded. Patient is postmenopausal.       Physical Exam     Physical Exam  Constitutional:       Appearance: Normal appearance.   HENT:      Head: Normocephalic and atraumatic.   Eyes:      Conjunctiva/sclera: Conjunctivae normal.      Pupils: Pupils are equal, round, and reactive to light.   Cardiovascular:      Rate and Rhythm: Normal rate and regular rhythm.      Pulses: Normal pulses.      Heart sounds: Normal heart sounds.   Pulmonary:      Effort: Pulmonary effort is normal.      Breath sounds: Normal breath sounds.   Abdominal:      General: Abdomen is flat. Bowel sounds are normal.   Musculoskeletal:         General: Swelling and tenderness present. No signs of injury. Normal range of motion.   Skin:     General: Skin is warm and dry.      Capillary Refill: Capillary refill takes less than 2 seconds.   Neurological:      General: No focal deficit present.      Mental Status: She is alert and oriented to person, place, and time. Mental status is at baseline.   Psychiatric:         Mood and Affect: Mood normal.         Thought Content: Thought " content normal.         Judgment: Judgment normal.

## 2024-11-08 ENCOUNTER — OFFICE VISIT (OUTPATIENT)
Dept: OBGYN CLINIC | Facility: CLINIC | Age: 64
End: 2024-11-08
Payer: COMMERCIAL

## 2024-11-08 VITALS
HEIGHT: 63 IN | BODY MASS INDEX: 36.32 KG/M2 | SYSTOLIC BLOOD PRESSURE: 135 MMHG | WEIGHT: 205 LBS | DIASTOLIC BLOOD PRESSURE: 70 MMHG

## 2024-11-08 DIAGNOSIS — S60.551A FOREIGN BODY OF RIGHT HAND, INITIAL ENCOUNTER: ICD-10-CM

## 2024-11-08 PROCEDURE — 99203 OFFICE O/P NEW LOW 30 MIN: CPT | Performed by: ORTHOPAEDIC SURGERY

## 2024-11-08 NOTE — PROGRESS NOTES
Last CPE was with Minnie Quigley NP on 12/17/20 and upcoming CPE is with Minnie on 12/20/21.    Please see below. Pt was seen for endo appt today and her BP's were elevated X2.     146/90  /96    No noted hx of HTN.  Not on any BP meds. Please advise.     The HAND & UPPER EXTREMITY OFFICE VISIT   Referred By:  Isaiah Mejia  [address] [id number]      Chief Complaint:     Right hand pain    History of Present Illness:   64 y.o., Right hand dominant female presents with Right hand pain    Patient states she was sent by Urgent care for a suspected foreign body in her hand. She also has a small bump on the ulnar side of her small finger DIP joint.  She states about 6-8 weeks ago she grabbed a metal plant stand and part of the paint flaked off and puncture her skin. She washed the area afterwards. She also grabbed a taylor basket and a small piece of the basket stuck her as well in the same general area.   She has noticed a small bump in the ulnar side of her hand. She decided to go to the Urgent care as she thought there was a foreign body in her hand that might be metal and she is going to have a MRI soon.  At the Urgent care her hand was XR and she was told there is a metallic object in her hand as well as a small finger DIP fracture. Urgent care note was reviewed  She states neither area is particularly painful. Both are sore at times. Her ulnar hand gets sore with pressure and she feels a from mass under the skin  Her DIP joint of her small finger has a prominent bump on the ulnar side of the DIP joint that will get red and irritated after working on a computer all day  She denies any fever or chills. No drainage from the small puncture wound on her ulnar hand    ADLs: Community ambulator/  Smoke: denies ETOH: denies   Drugs:  denies Job: employed       Past Medical History:  Past Medical History:   Diagnosis Date    Diabetes mellitus (HCC)     Hypertension      Past Surgical History:   Procedure Laterality Date    NASAL SEPTUM SURGERY       Family History   Problem Relation Age of Onset    Prostate cancer Father     Lung cancer Sister      Social History     Socioeconomic History    Marital status: Single     Spouse  name: Not on file    Number of children: Not on file    Years of education: Not on file    Highest education level: Not on file   Occupational History    Not on file   Tobacco Use    Smoking status: Never    Smokeless tobacco: Never   Vaping Use    Vaping status: Never Used   Substance and Sexual Activity    Alcohol use: Never    Drug use: Never    Sexual activity: Not Currently   Other Topics Concern    Not on file   Social History Narrative    Not on file     Social Determinants of Health     Financial Resource Strain: Low Risk  (12/5/2023)    Received from [facility]    Overall Financial Resource Strain (CARDIA)     Difficulty of Paying Living Expenses: Not very hard   Food Insecurity: No Food Insecurity (12/5/2023)    Received from [facility]    Hunger Vital Sign     Worried About Running Out of Food in the Last Year: Never true     Ran Out of Food in the Last Year: Never true   Transportation Needs: No Transportation Needs (12/5/2023)    Received from [facility]    PRAPARE - Transportation     Lack of Transportation (Medical): No     Lack of Transportation (Non-Medical): No   Physical Activity: Not on file   Stress: Stress Concern Present (12/5/2023)    Received from [facility]    [employer] [place] [employer] - Occupational Stress Questionnaire     Feeling of Stress : To some extent   Social Connections: Socially Isolated (12/5/2023)    Received from [facility]    Social Connection and Isolation Panel [NHANES]     Frequency of Communication with Friends and Family: More than three times a week     Frequency of Social Gatherings with Friends and Family: Patient declined     Attends [employer] Services: Never     Active Member of Clubs or Organizations: No     Attends Club or Organization Meetings: Never     Marital Status:    Intimate Partner Violence: Not At Risk (12/5/2023)    Received from [place]  [facility]    Humiliation, Afraid, Rape, and Kick questionnaire     Fear of Current or Ex-Partner: No     Emotionally Abused: No     Physically Abused: No     Sexually Abused: No   Housing Stability: Not on file     Scheduled Meds:  Continuous Infusions:No current facility-administered medications for this visit.    PRN Meds:.  Allergies   Allergen Reactions    Codeine      Other reaction(s): \"hyperactivity\"    Diphenhydramine      Other reaction(s): \"hyperactivity\"    Lisinopril      Other reaction(s): Chest Tightness    Sulfa Antibiotics      Other reaction(s): Rash/swelling           Physical Examination:    /70   Ht 5' 3\" (1.6 m)   Wt 93 kg (205 lb)   BMI 36.31 kg/m²     Gen: A&Ox3, NAD  Cardiac: regular rate  Chest: non labored breathing  Abdomen: Non-distended      Right Upper Extremity:  Skin CDI  No obvious deformity of the shoulder, arm, elbow, forearm, wrist, hand  Composite fist  Sensation intact to light touch in the axillary median, ulnar, and radial nerve distributions  motor motor  2+RP  Swelling noted ulnar hand proximal to base of her small finger.Non tender  Small healing puncture wound noted. No drainage or signs of infections    Small finger DIP joint  Firm mass noted at ulnar side of joint. Non tender    Left Upper Extremity:  Skin CDI  No obvious deformity of the shoulder, arm, elbow, forearm, wrist, hand  Composite fist  Sensation intact to light touch in the axillary median, ulnar, and radial nerve distributions  motor intact  2+RP      Studies:  Radiographs: I personally reviewed and independently interpreted the available radiographs.  11/7/2024: Radiographs of the Right hand, multiple views, demonstrate an free osteophyte at the ulnar small finger DIP joint consistent with osteoarthritis.  No metallic foreign bodies or radiopaque foreign bodies present.      Assessment and Plan:  1. Foreign body of right hand, initial encounter  Ambulatory Referral to Orthopedic Surgery "          64 y.o. female presents with signs and symptoms consistent with the above diagnosis.  We discussed the natural history of this condition and its pathogenesis.  We discussed operative and nonoperative treatment options.  Her XR was reviewed and she was informed there is no metal noted in her hand. She is safe to have her MRI. As far as a foreign body, we discussed a piece of the taylor basket could be lodged in her skin or there is scar tissue that formed from a puncture. If she wants to know we can order an US to confirm a foreign body. We also discussed if there is an object in her skin it may be expelled on its own by the body. She will monitor her hand   For her small finger DIP joint we discussed this is extra bone that her body grew due to arthritis. We can treat this conservatively    It is recommended they Return if symptoms worsen or fail to improve.  she expressed understanding of the plan and agreed. We encouraged them to contact our office with any questions or concerns.         Obdulio De León MD  Hand and Upper Extremity Surgery        *This note was dictated using Dragon voice recognition software. Please excuse any word substitutions or errors.*